# Patient Record
(demographics unavailable — no encounter records)

---

## 2025-02-28 NOTE — HISTORY OF PRESENT ILLNESS
[FreeTextEntry1] : 54 y/o male last seen in the office on 06/24/2024 for f/u from S/P RALP (3 years ago). Pt had CT performed 02/08/2025 and is here to review results and next steps. The patient reported recent left flank pain which spontaneously disappeared. No palpable pain noted upon exam. Pt also discussed ED at last visit and was switched from Tadalafil 20mg (due to erection usually starting on the day after having assumed the med), with Sildenafil 50mg.  Pt states that today  Denies hematuria, dysuria, flank pain, or any other urinary issues CHULA.  Occasionally eating spicy, citrus, chocolate. Adequately hydrating   FHx of PCA:   Tobacco use:   Last PSA post:<0.01 ng/mL (06/23/2024) Last creatinine: n/a Last UCx: negative (11/16/2023) Uro meds: Sidenafil 50 mg  Incidental finding of 1.5cm left kidney mass on US, then confirmed by CT scan.  12/10/2023 CT scan - KIDNEYS/URETERS: Left-sided exophytic renal cyst with a few bilateral subcentimeter hypoattenuating foci too small to characterize. No hydronephrosis.  02/08/2025 CT scan: No evidence of a suspicious renal mass Simple left renal cyst and bilateral too small to further characterize hypodensities. KIDNEYS/URETERS: Left-sided exophytic 1.3 cm renal cyst with a few bilateral subcentimeter hypoattenuating foci too small to characterize. No hydronephrosis. BLADDER: Within normal limits. REPRODUCTIVE ORGANS: Prostate within normal limits.

## 2025-03-04 NOTE — ASSESSMENT
[FreeTextEntry1] : 52 y/o male with S/P RALP (3 years ago). Pt had CT performed 02/08/2025 and is here to review results and next steps.  The patient reported recent left flank pain which spontaneously disappeared.  The patient is also stating that he has ED and is using Tadalafil 20mg with poor results (erection usually starting on the day after having assumed the med). Tadalafil is replaced with Sildenafil 50mg. Denies hematuria, dysuria, flank pain, or any other urinary issues CHULA. Occasionally eating spicy, citrus, chocolate. Adequately hydrating   FHx of PCA: denies Tobacco use: denies Last PSA post:<0.01 ng/mL (06/23/2024) Last creatinine: n/a Last UCx: negative (11/16/2023) Uro meds: Sidenafil 50 mg  12/10/2023 - CT scan KIDNEYS/URETERS: Left-sided exophytic renal cyst with a few bilateral subcentimeter hypoattenuating foci too small to characterize. No hydronephrosis.  02/08/2025 - CT scan: No evidence of a suspicious renal mass Simple left renal cyst and bilateral too small to further characterize hypodensities. KIDNEYS/URETERS: Left-sided exophytic 1.3 cm renal cyst with a few bilateral subcentimeter hypoattenuating foci too small to characterize. No hydronephrosis. BLADDER: Within normal limits. REPRODUCTIVE ORGANS: Prostate within normal limits.  CT scan is discussed with the patient. Blood draw for PSA + Renal Panel Collecting urine for UA and Culture  Will F/U in case of positive results. In case negative, will F/U in 1 year

## 2025-03-04 NOTE — HISTORY OF PRESENT ILLNESS
[FreeTextEntry1] : 54 y/o male with S/P RALP (3 years ago). Pt had CT performed 02/08/2025 and is here to review results and next steps.  The patient reported recent left flank pain which spontaneously disappeared.  The patient is also stating that he has ED and is using Tadalafil 20mg with poor results (erection usually starting on the day after having assumed the med). Tadalafil is replaced with Sildenafil 50mg. Denies hematuria, dysuria, flank pain, or any other urinary issues CHULA. Occasionally eating spicy, citrus, chocolate. Adequately hydrating   FHx of PCA: denies Tobacco use: denies Last PSA post:<0.01 ng/mL (06/23/2024) Last creatinine: n/a Last UCx: negative (11/16/2023) Uro meds: Sidenafil 50 mg  12/10/2023 - CT scan KIDNEYS/URETERS: Left-sided exophytic renal cyst with a few bilateral subcentimeter hypoattenuating foci too small to characterize. No hydronephrosis.